# Patient Record
Sex: MALE | Race: WHITE | Employment: FULL TIME | URBAN - NONMETROPOLITAN AREA
[De-identification: names, ages, dates, MRNs, and addresses within clinical notes are randomized per-mention and may not be internally consistent; named-entity substitution may affect disease eponyms.]

---

## 2021-08-12 ENCOUNTER — OFFICE VISIT (OUTPATIENT)
Dept: ENT CLINIC | Age: 71
End: 2021-08-12
Payer: OTHER GOVERNMENT

## 2021-08-12 VITALS
BODY MASS INDEX: 31.08 KG/M2 | SYSTOLIC BLOOD PRESSURE: 122 MMHG | DIASTOLIC BLOOD PRESSURE: 74 MMHG | WEIGHT: 198 LBS | HEIGHT: 67 IN

## 2021-08-12 DIAGNOSIS — H62.43 OTITIS EXTERNA, FUNGAL, BOTH EARS: Primary | ICD-10-CM

## 2021-08-12 DIAGNOSIS — B36.9 OTITIS EXTERNA, FUNGAL, BOTH EARS: Primary | ICD-10-CM

## 2021-08-12 PROCEDURE — 99203 OFFICE O/P NEW LOW 30 MIN: CPT | Performed by: PHYSICIAN ASSISTANT

## 2021-08-12 RX ORDER — AMLODIPINE BESYLATE 5 MG/1
TABLET ORAL
COMMUNITY
Start: 2021-06-21

## 2021-08-12 RX ORDER — IPRATROPIUM BROMIDE 42 UG/1
2 SPRAY, METERED NASAL 4 TIMES DAILY
COMMUNITY

## 2021-08-12 ASSESSMENT — ENCOUNTER SYMPTOMS
EYE PAIN: 0
SORE THROAT: 0
TROUBLE SWALLOWING: 0
SINUS PAIN: 0
FACIAL SWELLING: 0
PHOTOPHOBIA: 0
VOICE CHANGE: 0
RHINORRHEA: 0
SINUS PRESSURE: 0

## 2021-08-12 NOTE — PROGRESS NOTES
Bao Salinas is a pleasant 69-year-old  male that was referred by Dr. 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 Patient reports that over the last couple months he has had chronic ear pain with right greater than left. He also reports of muffled hearing that seems to be getting worse. He admits to drainage from the ear that is sporadic. He has been placed on several antibiotics without success. Currently denies any issues with fever, chills, or any tinnitus or vertigo. Allergies: Patient has no known allergies. Current Outpatient Medications   Medication Sig Dispense Refill    ipratropium (ATROVENT) 0.06 % nasal spray 2 sprays by Each Nostril route 4 times daily      amLODIPine (NORVASC) 5 MG tablet TAKE 1 TABLET BY MOUTH EVERY DAY      Cetirizine HCl (ZYRTEC ALLERGY PO) Take  by mouth daily as needed. No current facility-administered medications for this visit. Past Surgical History:   Procedure Laterality Date    CARPAL TUNNEL RELEASE      CHOLECYSTECTOMY      FOOT SURGERY      HEMORRHOID SURGERY         Past Medical History:   Diagnosis Date    Hypertension     Sinusitis        History reviewed. No pertinent family history. Social History     Tobacco Use    Smoking status: Former Smoker     Quit date: 2005     Years since quittin.5    Smokeless tobacco: Former User   Substance Use Topics    Alcohol use: No           REVIEW OF SYSTEMS:  all other systems reviewed and are negative  Review of Systems   Constitutional: Negative for chills and fever. HENT: Negative for congestion, dental problem, ear discharge, ear pain, facial swelling, hearing loss, postnasal drip, rhinorrhea, sinus pressure, sinus pain, sore throat, tinnitus, trouble swallowing and voice change. Eyes: Negative for photophobia and pain. Neurological: Negative for dizziness and headaches.            Comments:     PHYSICAL EXAM:    /74   Ht 5' 7\" (1.702 m)   Wt 198 lb (89.8 kg)   BMI 31.01 kg/m²   Body mass index is 31.01 kg/m². General Appearance: well developed  and well nourished  Head/ Face: normocephalic and atraumatic  Vocal Quality: good/ normal  Ears: Right Ear: External: external ears normal Otoscopy Ear Canal: fungal elements/ discharge Otoscopy TM: TM's normal and TM's mobile Left Ear: External: external ears normal Otoscopy Ear Canal: fungal elements/ discharge Otoscopy TM: TM's normal and TM's mobile  Hearing: grossly intact  Nose: nares normal and septum midline  Neck: supple and adenopathy none palpable  Thyroid: normal and nodules No   The right ear was suctioned down to the TM due to a large amount of purulent material with fungal elements noted. Assessment & Plan:    Problem List Items Addressed This Visit     Otitis externa, fungal, both ears - Primary     Fungal otitis externa-bilaterally  Plan: I have recommended treating the patient with ACHS compounded capsules given twice a day for 7 days to both ears. The patient is to follow-up with me in 2 weeks for reevaluation after completion of treatment. He was reminded to call if he has any questions or problems. No orders of the defined types were placed in this encounter. No orders of the defined types were placed in this encounter. Electronically signed by Janes Horner PA-C on 8/12/21 at 5:30 PM CDT          Please note that this chart was generated using dragon dictation software. Although every effort was made to ensure the accuracy of this automated transcription, some errors in transcription may have occurred.

## 2021-08-12 NOTE — ASSESSMENT & PLAN NOTE
Fungal otitis externa-bilaterally  Plan: I have recommended treating the patient with ACHS compounded capsules given twice a day for 7 days to both ears. The patient is to follow-up with me in 2 weeks for reevaluation after completion of treatment. He was reminded to call if he has any questions or problems.

## 2021-08-26 ENCOUNTER — OFFICE VISIT (OUTPATIENT)
Dept: ENT CLINIC | Age: 71
End: 2021-08-26
Payer: MEDICARE

## 2021-08-26 VITALS
SYSTOLIC BLOOD PRESSURE: 128 MMHG | HEIGHT: 67 IN | DIASTOLIC BLOOD PRESSURE: 70 MMHG | BODY MASS INDEX: 30.61 KG/M2 | WEIGHT: 195 LBS

## 2021-08-26 DIAGNOSIS — B36.9 OTITIS EXTERNA, FUNGAL, BOTH EARS: Primary | ICD-10-CM

## 2021-08-26 DIAGNOSIS — H62.43 OTITIS EXTERNA, FUNGAL, BOTH EARS: Primary | ICD-10-CM

## 2021-08-26 PROCEDURE — 99213 OFFICE O/P EST LOW 20 MIN: CPT | Performed by: PHYSICIAN ASSISTANT

## 2021-08-26 NOTE — PROGRESS NOTES
Mr. Keila Vega is a pleasant 67-year-old  male that presents for follow-up after treatment for fungal otitis externa bilaterally. He was treated with ACHS compounded capsules twice a day for a week. Currently he reports that the ears feel much improved with muffled hearing as anticipated. Physical examination with the microscope revealed total resolution of the fungal otitis externa to the right ear. The medication residue was suction down to the TM with no complications. The TM was intact with no evidence of infection. Examination of the left side demonstrated a large amount of residual medication that was also suctioned down to the TM with no complications. There was total resolution of the fungal otitis externa with no evidence of infection or perforation to the TM. Neck exam demonstrated no lymphadenopathy. Impression: Resolved bilateral fungal otitis externa    Plan: The patient was advised to follow-up with me as needed. He was reminded to call if he has any questions or problems.       Electronically signed by Pati Billings PA-C on 8/26/21 at 9:37 AM CDT

## 2023-02-23 ENCOUNTER — OFFICE VISIT (OUTPATIENT)
Dept: ENT CLINIC | Age: 73
End: 2023-02-23
Payer: OTHER GOVERNMENT

## 2023-02-23 VITALS
SYSTOLIC BLOOD PRESSURE: 136 MMHG | DIASTOLIC BLOOD PRESSURE: 66 MMHG | WEIGHT: 203 LBS | HEIGHT: 67 IN | BODY MASS INDEX: 31.86 KG/M2

## 2023-02-23 DIAGNOSIS — H61.22 IMPACTED CERUMEN OF LEFT EAR: Primary | ICD-10-CM

## 2023-02-23 PROCEDURE — 69210 REMOVE IMPACTED EAR WAX UNI: CPT | Performed by: PHYSICIAN ASSISTANT

## 2023-02-23 PROCEDURE — 1123F ACP DISCUSS/DSCN MKR DOCD: CPT | Performed by: PHYSICIAN ASSISTANT

## 2023-02-23 NOTE — PROGRESS NOTES
Mr. Charlene Christianson is a pleasant 78-year-old  male that was referred by the [facility] and represents with complaints of left ear pain. Patient reports that he was virtually seen by the [facility] and was told that he had a recurrent fungal otitis externa of the left ear. Patient reports that he has difficulty hearing from the ear and is having lots of pain. He was placed on Lotrimin drops without any success. Physical examination with the microscope demonstrated the patient to have a cerumen impaction to the left ear. There were no fungal elements or any purulent material present. The cerumen was successfully removed with alligator graspers and suction. After disimpaction the TM appeared to be normal with no evidence of perforation or infection. Examination of the right ear demonstrated normal TM and canal.  Neck exam demonstrated no lymphadenopathy or thyromegaly. Oral exam was unrevealing. Impression: Cerumen impaction of the left ear-successfully removed with microscopic guidance with no evidence of a fungal OE    Plan: Due to the patient's history of recurring cerumen impactions, I recommended a follow-up in 6 months for reevaluation. Patient was reminded to call if he has any questions or problems.       Electronically signed by Erich Rothman PA-C on 2/23/23 at 11:25 AM CST

## 2023-08-18 ENCOUNTER — OFFICE VISIT (OUTPATIENT)
Dept: ENT CLINIC | Age: 73
End: 2023-08-18

## 2023-08-18 VITALS
WEIGHT: 201 LBS | HEIGHT: 67 IN | BODY MASS INDEX: 31.55 KG/M2 | DIASTOLIC BLOOD PRESSURE: 70 MMHG | SYSTOLIC BLOOD PRESSURE: 136 MMHG

## 2023-08-18 DIAGNOSIS — H61.22 IMPACTED CERUMEN OF LEFT EAR: Primary | ICD-10-CM

## 2023-08-18 DIAGNOSIS — H91.8X9 ASYMMETRICAL HEARING LOSS: ICD-10-CM

## 2023-08-18 NOTE — PROGRESS NOTES
Mr. Philipp Austin is a pleasant 28-year-old  male that presents for a 6-month cerumen check. Patient reports that he was recently treated for a fungal infection of the left ear due to his left ear stopping up. He continues to complain of asymmetrical hearing changes to the left ear and believes he has recurring cerumen impaction. He denies any drainage from the canal or any issues with fever or chills. Physical examination with the microscope revealed the patient to have a cerumen impaction to the left ear that was plastered against the TM. This was removed with suction without any major complications. After disimpaction the TM appeared to be normal with no evidence of infection. Examination of the right ear demonstrated a partial amount of cerumen present that was also removed with suction. The TM appeared to be normal after disimpaction. Neck exam demonstrated no lymphadenopathy or thyromegaly. After disimpaction, patient reports that he still feels like he has diminished hearing to the left ear with a hissing sound only to the left ear. He admits to nonpulsatile tinnitus has been chronic to the right ear. Patient admits to lots of exposure to loud noises in the past to include loud fighter jets due to being in the [place] of [employer]. Impression: Successful cerumen disimpaction of the left ear with questionable asymmetrical hearing loss of the left ear based on clinical findings    Plan: I will have the patient to see Marie for audiology screening due to this asymmetry. He is to follow-up with me in 6 months for a cerumen check.       Electronically signed by Flor Mcgrath PA-C on 8/18/23 at 10:48 AM CDT

## 2023-08-25 ENCOUNTER — PROCEDURE VISIT (OUTPATIENT)
Dept: ENT CLINIC | Age: 73
End: 2023-08-25
Payer: MEDICARE

## 2023-08-25 DIAGNOSIS — H93.12 TINNITUS OF LEFT EAR: ICD-10-CM

## 2023-08-25 DIAGNOSIS — H90.6 MIXED CONDUCTIVE AND SENSORINEURAL HEARING LOSS OF BOTH EARS: Primary | ICD-10-CM

## 2023-08-25 PROCEDURE — 92567 TYMPANOMETRY: CPT | Performed by: AUDIOLOGIST

## 2023-08-25 PROCEDURE — 92557 COMPREHENSIVE HEARING TEST: CPT | Performed by: AUDIOLOGIST

## 2023-08-25 NOTE — PROGRESS NOTES
History   William Gonzales is a 75 y.o. male who presented to the clinic this date with complaints of decrease hearing and tinnitus in his left. He reported history of recurrent otitis externa in the left side. He reported history of  and occupational noise exposure. Summary   Tympanometry consistent with reduced TM mobility bilaterally. Pure tone testing indicates normal to moderately severe mixed hearing loss bilaterally. Word recognition scores were excellent bilaterally. Based on degree of hearing loss, binaural hearing aids are recommended. Results   Otoscopy:   Right: Clear EAC/Normal TM  Left: Cerumen on TM    Audiometry:   Right:  Normal to moderately severe  sloping mixed hearing loss  Left:  Normal to moderately severe  sloping mixed hearing loss         Tympanometry:    Right: Type As  Left: Type As      Plan   Results of today's testing were discussed with Mr. Hong Salazar and the following recommendations were made: Follow up with ENT as scheduled. Monitor hearing yearly, sooner with changes. Hearing aid evaluation as desired. Hearing protection as warranted.         Audiogram and Acoustic Immittance

## 2024-02-23 ENCOUNTER — OFFICE VISIT (OUTPATIENT)
Dept: ENT CLINIC | Age: 74
End: 2024-02-23

## 2024-02-23 VITALS
BODY MASS INDEX: 31.86 KG/M2 | DIASTOLIC BLOOD PRESSURE: 72 MMHG | SYSTOLIC BLOOD PRESSURE: 136 MMHG | WEIGHT: 203 LBS | HEIGHT: 67 IN

## 2024-02-23 DIAGNOSIS — H61.23 BILATERAL IMPACTED CERUMEN: Primary | ICD-10-CM

## 2024-02-23 RX ORDER — MOMETASONE FUROATE 1 MG/G
OINTMENT TOPICAL
Qty: 15 G | Refills: 3 | Status: SHIPPED | OUTPATIENT
Start: 2024-02-23

## 2024-02-23 NOTE — PROGRESS NOTES
Mr. Watkins is a pleasant 73-year-old  male that presents for a 6-month cerumen check.  Patient reports that he has noticed some itching of the left ear but is noticed no obvious hearing changes.  Overall he has been doing pretty well.      Physical examination with microscope revealed the patient to have cerumen to the left ear that was removed with suction.  The right ear demonstrated evidence of a cerumen impaction.  This was removed with suction and alligator graspers no complications.  After disimpaction the TMs appear to be normal.  Neck exam demonstrated no lymphadenopathy or thyromegaly.      Impression: Successful cerumen disimpaction bilaterally with microscopy and instrumentation    Plan: Patient is follow-up in 6 months for cerumen check.  He was reminded to call if he has any questions or concerns.      Electronically signed by CARMELO PEREZ PA-C on 2/23/24 at 2:01 PM CST

## 2024-08-23 ENCOUNTER — OFFICE VISIT (OUTPATIENT)
Dept: ENT CLINIC | Age: 74
End: 2024-08-23
Payer: MEDICARE

## 2024-08-23 VITALS
WEIGHT: 195 LBS | DIASTOLIC BLOOD PRESSURE: 78 MMHG | SYSTOLIC BLOOD PRESSURE: 134 MMHG | BODY MASS INDEX: 30.61 KG/M2 | HEIGHT: 67 IN

## 2024-08-23 DIAGNOSIS — H61.23 BILATERAL IMPACTED CERUMEN: Primary | ICD-10-CM

## 2024-08-23 PROCEDURE — 1036F TOBACCO NON-USER: CPT | Performed by: PHYSICIAN ASSISTANT

## 2024-08-23 PROCEDURE — G8417 CALC BMI ABV UP PARAM F/U: HCPCS | Performed by: PHYSICIAN ASSISTANT

## 2024-08-23 PROCEDURE — G8427 DOCREV CUR MEDS BY ELIG CLIN: HCPCS | Performed by: PHYSICIAN ASSISTANT

## 2024-08-23 PROCEDURE — 3017F COLORECTAL CA SCREEN DOC REV: CPT | Performed by: PHYSICIAN ASSISTANT

## 2024-08-23 PROCEDURE — 1123F ACP DISCUSS/DSCN MKR DOCD: CPT | Performed by: PHYSICIAN ASSISTANT

## 2024-08-23 PROCEDURE — 99212 OFFICE O/P EST SF 10 MIN: CPT | Performed by: PHYSICIAN ASSISTANT

## 2024-08-23 NOTE — PROGRESS NOTES
Mr. Swartz is a very pleasant 74-year-old  male that presents for a 6-month cerumen check.  He reports that he has been faithfully cleaning his ears with peroxide on a weekly basis.  He denies any drainage from the ears or any obvious hearing changes.      Physical examination with the microscope revealed a very tiny amount of cerumen in the canals bilaterally that was removed with suction with no complications.  The TMs appear to be normal with no evidence of infection or perforation.  Neck exam demonstrated no lymphadenopathy or thyromegaly.      Impression: Successful removal of partial cerumen impaction with microscopy and instrumentation    Plan: Patient is to follow-up in 6 months for cerumen check.  He was reminded to call if he has any questions or concerns.      Electronically signed by CARMELO PEREZ PA-C on 8/23/24 at 10:10 AM CDT

## 2025-03-05 ENCOUNTER — TELEPHONE (OUTPATIENT)
Dept: ENT CLINIC | Age: 75
End: 2025-03-05

## 2025-03-05 NOTE — TELEPHONE ENCOUNTER
Called PT to verify insurance being [employer] for this visit. PThas upcoming appt without active [employer] auth. no answer. left vmail.

## 2025-03-27 ENCOUNTER — OFFICE VISIT (OUTPATIENT)
Dept: ENT CLINIC | Age: 75
End: 2025-03-27
Payer: MEDICARE

## 2025-03-27 VITALS
SYSTOLIC BLOOD PRESSURE: 152 MMHG | DIASTOLIC BLOOD PRESSURE: 80 MMHG | WEIGHT: 200 LBS | BODY MASS INDEX: 31.39 KG/M2 | HEIGHT: 67 IN

## 2025-03-27 DIAGNOSIS — H60.8X3 CHRONIC ECZEMATOUS OTITIS EXTERNA OF BOTH EARS: ICD-10-CM

## 2025-03-27 DIAGNOSIS — Z86.69 H/O IMPACTED CERUMEN: Primary | ICD-10-CM

## 2025-03-27 PROCEDURE — 1036F TOBACCO NON-USER: CPT | Performed by: PHYSICIAN ASSISTANT

## 2025-03-27 PROCEDURE — G8427 DOCREV CUR MEDS BY ELIG CLIN: HCPCS | Performed by: PHYSICIAN ASSISTANT

## 2025-03-27 PROCEDURE — 1123F ACP DISCUSS/DSCN MKR DOCD: CPT | Performed by: PHYSICIAN ASSISTANT

## 2025-03-27 PROCEDURE — 99212 OFFICE O/P EST SF 10 MIN: CPT | Performed by: PHYSICIAN ASSISTANT

## 2025-03-27 PROCEDURE — 1159F MED LIST DOCD IN RCRD: CPT | Performed by: PHYSICIAN ASSISTANT

## 2025-03-27 PROCEDURE — G8417 CALC BMI ABV UP PARAM F/U: HCPCS | Performed by: PHYSICIAN ASSISTANT

## 2025-03-27 PROCEDURE — 3017F COLORECTAL CA SCREEN DOC REV: CPT | Performed by: PHYSICIAN ASSISTANT

## 2025-03-27 PROCEDURE — 1160F RVW MEDS BY RX/DR IN RCRD: CPT | Performed by: PHYSICIAN ASSISTANT

## 2025-03-27 NOTE — PROGRESS NOTES
Mr. Watkins is a pleasant 74-year-old  male that presents for a 6-month cerumen check.  He reports that he experienced sudden hearing loss to the left ear and was discovered to have a large cerumen impaction.  He self treated this with peroxide and was able to disimpact himself.  Currently he reports of no hearing changes or any itching of the canals.      Physical examination with the microscope revealed the patient to have normal TMs and canals with no evidence of cerumen being present.  Neck exam demonstrated no lymphadenopathy or thyromegaly.  Oral exam demonstrated the tongue to be midline with no lesions to the surface.  The posterior pharynx demonstrated no masses.      Impression: Resolved cerumen impaction of the left ear with normal exam today    Plan: Patient is to follow-up in 6 months for cerumen check.  He was reminded to call if he has any questions or concerns.      Electronically signed by CARMELO PEREZ PA-C on 3/27/25 at 10:05 AM GARRYT